# Patient Record
Sex: FEMALE | URBAN - METROPOLITAN AREA
[De-identification: names, ages, dates, MRNs, and addresses within clinical notes are randomized per-mention and may not be internally consistent; named-entity substitution may affect disease eponyms.]

---

## 2024-08-20 ENCOUNTER — OFFICE VISIT (OUTPATIENT)
Dept: URBAN - METROPOLITAN AREA CLINIC 94 | Facility: CLINIC | Age: 34
End: 2024-08-20

## 2024-08-28 ENCOUNTER — LAB OUTSIDE AN ENCOUNTER (OUTPATIENT)
Dept: URBAN - METROPOLITAN AREA CLINIC 94 | Facility: CLINIC | Age: 34
End: 2024-08-28

## 2024-08-28 ENCOUNTER — DASHBOARD ENCOUNTERS (OUTPATIENT)
Age: 34
End: 2024-08-28

## 2024-08-28 ENCOUNTER — OFFICE VISIT (OUTPATIENT)
Dept: URBAN - METROPOLITAN AREA CLINIC 94 | Facility: CLINIC | Age: 34
End: 2024-08-28
Payer: COMMERCIAL

## 2024-08-28 VITALS
DIASTOLIC BLOOD PRESSURE: 76 MMHG | TEMPERATURE: 98.7 F | SYSTOLIC BLOOD PRESSURE: 122 MMHG | WEIGHT: 124 LBS | OXYGEN SATURATION: 98 % | HEART RATE: 80 BPM | HEIGHT: 61 IN | BODY MASS INDEX: 23.41 KG/M2

## 2024-08-28 DIAGNOSIS — R11.2 NAUSEA AND VOMITING, UNSPECIFIED VOMITING TYPE: ICD-10-CM

## 2024-08-28 DIAGNOSIS — K90.9 FATTY STOOLS: ICD-10-CM

## 2024-08-28 DIAGNOSIS — R10.30 LOWER ABDOMINAL PAIN: ICD-10-CM

## 2024-08-28 DIAGNOSIS — K62.5 RECTAL BLEEDING: ICD-10-CM

## 2024-08-28 PROBLEM — 66187002: Status: ACTIVE | Noted: 2024-08-28

## 2024-08-28 PROCEDURE — 99214 OFFICE O/P EST MOD 30 MIN: CPT | Performed by: INTERNAL MEDICINE

## 2024-08-28 NOTE — HPI-TODAY'S VISIT:
35 yo F evaluated today rectal bleeding, abdominal pain, and oily stools.   Pt reports > 1 yr noticed change in bowel habits with 5-6 BMs/day oily loose stools with rectal bleeding.Denies ever having solid stools and denies constipation. Sx are daily. Also reports intermittent lower abdominal pain. Denies alleviating sx but [place] or food may trigger pain. Also intermittent nausea with vomiting.  Denies ever completing GI work-up.   Denies changes in diet. Denies medication use except MVI and FLaxseed and Collagen. Denies GB removal.   Intentional wt loss with working out more. Diet is high in lean protein.   Denies any signifiant medical hx of family hx.

## 2024-08-29 LAB
A/G RATIO: 1.9
ABSOLUTE BASOPHILS: 20
ABSOLUTE EOSINOPHILS: 82
ABSOLUTE LYMPHOCYTES: 1454
ABSOLUTE MONOCYTES: 342
ABSOLUTE NEUTROPHILS: 3203
ALBUMIN: 5
ALKALINE PHOSPHATASE: 42
ALT (SGPT): 13
AST (SGOT): 20
BASOPHILS: 0.4
BILIRUBIN, TOTAL: 1.1
BUN/CREATININE RATIO: (no result)
BUN: 13
C-REACTIVE PROTEIN, QUANT: <3
CALCIUM: 10
CARBON DIOXIDE, TOTAL: 27
CHLORIDE: 103
CREATININE: 0.65
EGFR: 117
EOSINOPHILS: 1.6
GLOBULIN, TOTAL: 2.6
GLUCOSE: 91
HEMATOCRIT: 43.6
HEMOGLOBIN: 14.9
IMMUNOGLOBULIN A: 74
INTERPRETATION: (no result)
LYMPHOCYTES: 28.5
MCH: 32.3
MCHC: 34.2
MCV: 94.4
MONOCYTES: 6.7
MPV: 9.6
NEUTROPHILS: 62.8
PLATELET COUNT: 232
POTASSIUM: 4.1
PROTEIN, TOTAL: 7.6
RDW: 11.7
RED BLOOD CELL COUNT: 4.62
SODIUM: 139
TISSUE TRANSGLUTAMINASE AB, IGA: <1
WHITE BLOOD CELL COUNT: 5.1

## 2024-09-05 ENCOUNTER — TELEPHONE ENCOUNTER (OUTPATIENT)
Dept: URBAN - METROPOLITAN AREA CLINIC 94 | Facility: CLINIC | Age: 34
End: 2024-09-05

## 2024-09-10 ENCOUNTER — TELEPHONE ENCOUNTER (OUTPATIENT)
Dept: URBAN - METROPOLITAN AREA CLINIC 94 | Facility: CLINIC | Age: 34
End: 2024-09-10

## 2024-10-08 ENCOUNTER — CLAIMS CREATED FROM THE CLAIM WINDOW (OUTPATIENT)
Dept: URBAN - METROPOLITAN AREA SURGERY CENTER 17 | Facility: SURGERY CENTER | Age: 34
End: 2024-10-08
Payer: COMMERCIAL

## 2024-10-08 DIAGNOSIS — K63.89 OTHER SPECIFIED DISEASES OF INTESTINE: ICD-10-CM

## 2024-10-08 DIAGNOSIS — K62.5 HEMORRHAGE OF ANUS AND RECTUM: ICD-10-CM

## 2024-10-08 DIAGNOSIS — R19.7 DIARRHEA, UNSPECIFIED: ICD-10-CM

## 2024-10-08 DIAGNOSIS — K64.8 INTERNAL HEMORRHOIDS: ICD-10-CM

## 2024-10-08 DIAGNOSIS — K62.89 RECTAL PAIN: ICD-10-CM

## 2024-10-08 PROCEDURE — 45380 COLONOSCOPY AND BIOPSY: CPT | Performed by: INTERNAL MEDICINE

## 2024-10-08 PROCEDURE — 00811 ANES LWR INTST NDSC NOS: CPT | Performed by: NURSE ANESTHETIST, CERTIFIED REGISTERED

## 2024-10-08 RX ORDER — DICYCLOMINE HYDROCHLORIDE 20 MG/1
1 TABLET TABLET ORAL THREE TIMES A DAY
Qty: 90 TABLET | Refills: 11 | OUTPATIENT
Start: 2024-10-08 | End: 2025-10-03

## 2024-10-08 RX ORDER — HYDROCORTISONE ACETATE 25 MG/1
1 SUPPOSITORY SUPPOSITORY RECTAL ONCE A DAY
Qty: 14 SUPPOSITORY | Refills: 2 | OUTPATIENT
Start: 2024-10-08 | End: 2024-11-18

## 2024-10-11 ENCOUNTER — TELEPHONE ENCOUNTER (OUTPATIENT)
Dept: URBAN - METROPOLITAN AREA CLINIC 94 | Facility: CLINIC | Age: 34
End: 2024-10-11

## 2024-10-24 ENCOUNTER — OFFICE VISIT (OUTPATIENT)
Dept: URBAN - METROPOLITAN AREA CLINIC 94 | Facility: CLINIC | Age: 34
End: 2024-10-24

## 2024-11-08 ENCOUNTER — OFFICE VISIT (OUTPATIENT)
Dept: URBAN - METROPOLITAN AREA CLINIC 94 | Facility: CLINIC | Age: 34
End: 2024-11-08

## 2024-12-05 ENCOUNTER — OFFICE VISIT (OUTPATIENT)
Dept: URBAN - METROPOLITAN AREA CLINIC 94 | Facility: CLINIC | Age: 34
End: 2024-12-05